# Patient Record
Sex: FEMALE | Race: WHITE | NOT HISPANIC OR LATINO | Employment: OTHER | URBAN - NONMETROPOLITAN AREA
[De-identification: names, ages, dates, MRNs, and addresses within clinical notes are randomized per-mention and may not be internally consistent; named-entity substitution may affect disease eponyms.]

---

## 2018-01-15 ENCOUNTER — HOSPITAL ENCOUNTER (OUTPATIENT)
Dept: GENERAL RADIOLOGY | Facility: HOSPITAL | Age: 68
Discharge: HOME | End: 2018-01-15
Admitting: INTERNAL MEDICINE

## 2018-01-15 ENCOUNTER — TRANSCRIBE ORDERS (OUTPATIENT)
Dept: ADMINISTRATIVE | Facility: HOSPITAL | Age: 68
End: 2018-01-15

## 2018-01-15 DIAGNOSIS — M79.7 FIBROMYALGIA: ICD-10-CM

## 2018-01-15 DIAGNOSIS — L40.50 ARTHROPATHIC PSORIASIS, UNSPECIFIED (HCC): Primary | ICD-10-CM

## 2018-01-15 DIAGNOSIS — M15.0 PRIMARY GENERALIZED (OSTEO)ARTHRITIS: ICD-10-CM

## 2018-01-15 PROCEDURE — 73522 X-RAY EXAM HIPS BI 3-4 VIEWS: CPT

## 2019-07-03 ENCOUNTER — OFFICE VISIT (OUTPATIENT)
Dept: PULMONOLOGY | Facility: CLINIC | Age: 69
End: 2019-07-03

## 2019-07-03 VITALS
DIASTOLIC BLOOD PRESSURE: 60 MMHG | WEIGHT: 152 LBS | HEIGHT: 65 IN | HEART RATE: 98 BPM | BODY MASS INDEX: 25.33 KG/M2 | OXYGEN SATURATION: 99 % | TEMPERATURE: 98.4 F | SYSTOLIC BLOOD PRESSURE: 115 MMHG

## 2019-07-03 DIAGNOSIS — R91.1 RIGHT UPPER LOBE PULMONARY NODULE: Primary | ICD-10-CM

## 2019-07-03 DIAGNOSIS — J96.11 CHRONIC RESPIRATORY FAILURE WITH HYPOXIA (HCC): ICD-10-CM

## 2019-07-03 DIAGNOSIS — C34.11 MALIGNANT NEOPLASM OF UPPER LOBE OF RIGHT LUNG (HCC): ICD-10-CM

## 2019-07-03 DIAGNOSIS — J43.2 CENTRILOBULAR EMPHYSEMA (HCC): ICD-10-CM

## 2019-07-03 PROCEDURE — 99204 OFFICE O/P NEW MOD 45 MIN: CPT | Performed by: INTERNAL MEDICINE

## 2019-07-03 RX ORDER — FERROUS SULFATE TAB EC 324 MG (65 MG FE EQUIVALENT) 324 (65 FE) MG
324 TABLET DELAYED RESPONSE ORAL
COMMUNITY

## 2019-07-03 RX ORDER — ALBUTEROL SULFATE 90 UG/1
AEROSOL, METERED RESPIRATORY (INHALATION)
Refills: 3 | COMMUNITY
Start: 2019-05-21 | End: 2020-01-07 | Stop reason: SDUPTHER

## 2019-07-03 RX ORDER — ALBUTEROL SULFATE 2.5 MG/3ML
2.5 SOLUTION RESPIRATORY (INHALATION)
COMMUNITY
Start: 2019-05-17 | End: 2020-01-07 | Stop reason: SDUPTHER

## 2019-07-03 RX ORDER — M-VIT,TX,IRON,MINS/CALC/FOLIC 27MG-0.4MG
1 TABLET ORAL
COMMUNITY

## 2019-07-03 RX ORDER — BUDESONIDE AND FORMOTEROL FUMARATE DIHYDRATE 160; 4.5 UG/1; UG/1
2 AEROSOL RESPIRATORY (INHALATION)
COMMUNITY
Start: 2019-03-11 | End: 2020-01-07 | Stop reason: SDUPTHER

## 2019-07-03 RX ORDER — CLOPIDOGREL BISULFATE 75 MG/1
75 TABLET ORAL
COMMUNITY
Start: 2019-01-30

## 2019-07-03 RX ORDER — SPIRONOLACTONE 50 MG/1
25 TABLET, FILM COATED ORAL
COMMUNITY
Start: 2019-01-30

## 2019-07-03 RX ORDER — CHLORAL HYDRATE 500 MG
1000 CAPSULE ORAL
COMMUNITY

## 2019-07-03 RX ORDER — LOSARTAN POTASSIUM 25 MG/1
25 TABLET ORAL
COMMUNITY
Start: 2019-01-30

## 2019-07-03 RX ORDER — NITROGLYCERIN 6.5 MG/1
CAPSULE ORAL
COMMUNITY
Start: 2019-04-10

## 2019-07-03 RX ORDER — FENOFIBRATE 48 MG/1
48 TABLET, COATED ORAL
COMMUNITY
Start: 2019-01-30

## 2019-07-03 RX ORDER — PANTOPRAZOLE SODIUM 40 MG/1
40 TABLET, DELAYED RELEASE ORAL
COMMUNITY
Start: 2019-01-30

## 2019-07-03 RX ORDER — ASPIRIN 81 MG/1
81 TABLET ORAL
COMMUNITY

## 2019-07-03 RX ORDER — HYDROXYCHLOROQUINE SULFATE 200 MG/1
TABLET, FILM COATED ORAL
Refills: 1 | COMMUNITY
Start: 2019-06-07

## 2019-07-03 NOTE — PROGRESS NOTES
Interval history since last visit: None    Recent hospitalizations: None    Investigations (imaging, PFT's, labs, sleep study, record requests, etc.) CT Chest    Have you had the Influenza Vaccine? yes    Would you like to receive this Vaccine today? no    Have you had the Pneumonia Vaccine?  yes   Would you like to receive this Vaccine today? no    Subjective    Bhavana Hanson presents for the following Lung Cancer      Lung Cancer   Associated symptoms include coughing (Productive). Pertinent negatives include no chest pain, chills, congestion, fatigue or nausea.        Review of Systems   Constitutional: Negative for activity change, appetite change, chills, fatigue and unexpected weight change.   HENT: Negative for congestion, postnasal drip and rhinorrhea.    Respiratory: Positive for cough (Productive), shortness of breath and wheezing. Negative for apnea and chest tightness.    Cardiovascular: Negative for chest pain, palpitations and leg swelling.   Gastrointestinal: Negative for nausea.   Musculoskeletal: Negative for gait problem.   Skin: Negative for pallor.   Allergic/Immunologic: Negative for environmental allergies.   Neurological: Negative for syncope.   Psychiatric/Behavioral: Negative for confusion. The patient is not nervous/anxious.        Active Problems:  Problem List Items Addressed This Visit     None      Visit Diagnoses     Right upper lobe pulmonary nodule    -  Primary    Chronic respiratory failure with hypoxia (CMS/HCC)        Centrilobular emphysema (CMS/HCC)        Relevant Medications    albuterol (PROVENTIL) (2.5 MG/3ML) 0.083% nebulizer solution    budesonide-formoterol (SYMBICORT) 160-4.5 MCG/ACT inhaler    tiotropium (SPIRIVA HANDIHALER) 18 MCG per inhalation capsule    VENTOLIN  (90 Base) MCG/ACT inhaler    Malignant neoplasm of upper lobe of right lung (CMS/HCC)        Relevant Medications    albuterol (PROVENTIL) (2.5 MG/3ML) 0.083% nebulizer solution     budesonide-formoterol (SYMBICORT) 160-4.5 MCG/ACT inhaler    tiotropium (SPIRIVA HANDIHALER) 18 MCG per inhalation capsule    VENTOLIN  (90 Base) MCG/ACT inhaler          Past Medical History:  Past Medical History:   Diagnosis Date   • Arthritis    • COPD (chronic obstructive pulmonary disease) (CMS/HCC)    • Diabetes mellitus (CMS/HCC)    • Hypertension    • Lung cancer (CMS/[facility])        Family History:  Family History   Problem Relation Age of Onset   • Heart failure Father    • Cancer Maternal Aunt        Social History:  Social History     Tobacco Use   • Smoking status: Current Every Day Smoker     Packs/day: 0.25     Years: 40.00     Pack years: 10.00     Types: Cigarettes   • Smokeless tobacco: Never Used   Substance Use Topics   • Alcohol use: No     Frequency: Never       Current Medications:  Current Outpatient Medications   Medication Sig Dispense Refill   • albuterol (PROVENTIL) (2.5 MG/3ML) 0.083% nebulizer solution Inhale 2.5 mg.     • aspirin 81 MG EC tablet Take 81 mg by mouth.     • budesonide-formoterol (SYMBICORT) 160-4.5 MCG/ACT inhaler Inhale 2 puffs.     • clopidogrel (PLAVIX) 75 MG tablet Take 75 mg by mouth.     • fenofibrate (TRICOR) 48 MG tablet Take 48 mg by mouth.     • ferrous sulfate 324 (65 Fe) MG tablet delayed-release EC tablet Take 324 mg by mouth Daily With Breakfast.     • hydroxychloroquine (PLAQUENIL) 200 MG tablet TAKE 1 TABLET BY MOUTH 2 TIMES A DAY WITH FOOD AS DIRECTED BY YOUR PROVIDER -{KEEP OUT OF REACH OF CHILDREN}-  1   • losartan (COZAAR) 25 MG tablet Take 25 mg by mouth.     • nitroglycerin (NITRO-BID) 6.5 MG CR capsule TAKE 1 CAPSULE TWICE A DAY     • Omega-3 Fatty Acids (FISH OIL) 1000 MG capsule capsule Take 1,000 mg by mouth.     • pantoprazole (PROTONIX) 40 MG EC tablet Take 40 mg by mouth.     • spironolactone (ALDACTONE) 50 MG tablet Take 25 mg by mouth.     • therapeutic multivitamin-minerals (THERAGRAN-M) tablet Take 1 tablet by mouth.     • tiotropium  "(SPIRIVA HANDIHALER) 18 MCG per inhalation capsule Place 18 mcg into inhaler and inhale.     • VENTOLIN  (90 Base) MCG/ACT inhaler INHALE 2 PUFFS 4 TIMES A DAY AS NEEDED FOR SHORTNESS OF BREATH  3     No current facility-administered medications for this visit.        Allergies:  Allergies   Allergen Reactions   • Crestor [Rosuvastatin Calcium] Hives   • Lipitor [Atorvastatin Calcium] Hives       Vitals:  /60   Pulse 98   Temp 98.4 °F (36.9 °C) (Oral)   Ht 165.1 cm (65\")   Wt 68.9 kg (152 lb)   SpO2 99%   BMI 25.29 kg/m²     Imaging:    Imaging Results (most recent)     None          Pulmonary Functions Testing Results:    No results found for: FEV1, FVC, VXH3YXI, TLC, DLCO    No results found for this or any previous visit.    Objective   Physical Exam    Assessment/Plan       ICD-10-CM ICD-9-CM   1. Right upper lobe pulmonary nodule R91.1 793.11   2. Chronic respiratory failure with hypoxia (CMS/HCC) J96.11 518.83     799.02   3. Centrilobular emphysema (CMS/HCC) J43.2 492.8   4. Malignant neoplasm of upper lobe of right lung (CMS/HCC) C34.11 162.3       Return in about 3 months (around 10/3/2019).          "

## 2019-07-03 NOTE — PROGRESS NOTES
Subjective    Bhavana Hanson presents for the following Lung Cancer  .    History of Present Illness   Ms. Hanson is a pleasant 68-year-old female with a history of active smoking, right upper lobe nodule (not biopsied due to very poor lung function test at high risk for pneumothorax) status post radiation therapy, chronic hypoxic respiratory failure on 3 L/min of supplemental oxygen at rest and on exertion and severe COPD on Symbicort nebs, Spiriva nebs and rescue inhaler. she presents to pulmonary clinic for the evaluation and management of small focal opacity that was seen in posterior right upper lobe in the CT scan that was performed at [facility] on 6/10/2019.  As per her, shortness of breath on exertion is stable.  She uses 3 L/min of supplemental oxygen.  She is currently saturating 99%.  She is hemodynamically stable and afebrile.  She denies any cough or wheezing.  When she was admitted at [facility], she had symptoms of fever, productive cough and wheezing.  She denied any blood in the cough.  As per her she did lose 10 pounds of weight in last 6 months but she denies any night sweats or subjective fever.  As per her she underwent 5 cycles of radiation therapy in March 2019 and since then she had multiple episodes of pneumonia and she was on broad-spectrum antibiotics for that.    I have reviewed the CT scan report from [facility].  CT scan was performed on 6/10/2019.  As per the report, no convincing evidence of pneumonia.  Mild basilar atelectasis.  Previous interstitial pulmonary edema has resolved.  Bilateral pleural effusion are now tiny.  Small pericardial effusion.  Probable focal atelectasis in right upper lobe.  There was no significant adenopathy.  Small focal opacity in posterior right upper lobe has decreased in size slightly and is likely an area of atelectasis rather than neoplasia.  Otherwise lung parenchyma is clean.  Mitral  valve annular calcification.  Borderline cardiomegaly.    Review of system  Review of Systems   Constitution: Positive for weight loss. Negative for decreased appetite.   HENT: Negative for congestion and hoarse voice.    Eyes: Negative for blurred vision and double vision.   Cardiovascular: Negative for chest pain and cyanosis.   Respiratory: Positive for shortness of breath. Negative for cough, hemoptysis, sputum production and wheezing.    Endocrine: Negative for cold intolerance and heat intolerance.   Hematologic/Lymphatic: Negative for adenopathy and bleeding problem.   Skin: Negative for itching and rash.   Musculoskeletal: Negative for joint pain and joint swelling.   Gastrointestinal: Negative for bloating and abdominal pain.   Genitourinary: Negative for frequency and hematuria.   Neurological: Negative for dizziness and focal weakness.   Psychiatric/Behavioral: Negative for altered mental status and hallucinations.   Allergic/Immunologic: Negative for environmental allergies and hives.         Past Medical History:  Past Medical History:   Diagnosis Date   • Arthritis    • COPD (chronic obstructive pulmonary disease) (CMS/[facility])    • Diabetes mellitus (CMS/[facility])    • Hypertension    • Lung cancer (CMS/[facility])        Family History:  Family History   Problem Relation Age of Onset   • Heart failure Father    • Cancer Maternal Aunt        Social History:  Social History     Tobacco Use   • Smoking status: Current Every Day Smoker     Packs/day: 0.25     Years: 40.00     Pack years: 10.00     Types: Cigarettes   • Smokeless tobacco: Never Used   Substance Use Topics   • Alcohol use: No     Frequency: Never       Current Medications:  Current Outpatient Medications   Medication Sig Dispense Refill   • albuterol (PROVENTIL) (2.5 MG/3ML) 0.083% nebulizer solution Inhale 2.5 mg.     • aspirin 81 MG EC tablet Take 81 mg by mouth.     • budesonide-formoterol (SYMBICORT) 160-4.5 MCG/ACT inhaler Inhale 2 puffs.     •  "clopidogrel (PLAVIX) 75 MG tablet Take 75 mg by mouth.     • fenofibrate (TRICOR) 48 MG tablet Take 48 mg by mouth.     • ferrous sulfate 324 (65 Fe) MG tablet delayed-release EC tablet Take 324 mg by mouth Daily With Breakfast.     • hydroxychloroquine (PLAQUENIL) 200 MG tablet TAKE 1 TABLET BY MOUTH 2 TIMES A DAY WITH FOOD AS DIRECTED BY YOUR PROVIDER --  1   • losartan (COZAAR) 25 MG tablet Take 25 mg by mouth.     • nitroglycerin (NITRO-BID) 6.5 MG CR capsule TAKE 1 CAPSULE TWICE A DAY     • Omega-3 Fatty Acids (FISH OIL) 1000 MG capsule capsule Take 1,000 mg by mouth.     • pantoprazole (PROTONIX) 40 MG EC tablet Take 40 mg by mouth.     • spironolactone (ALDACTONE) 50 MG tablet Take 25 mg by mouth.     • therapeutic multivitamin-minerals (THERAGRAN-M) tablet Take 1 tablet by mouth.     • tiotropium (SPIRIVA HANDIHALER) 18 MCG per inhalation capsule Place 18 mcg into inhaler and inhale.     • VENTOLIN  (90 Base) MCG/ACT inhaler INHALE 2 PUFFS 4 TIMES A DAY AS NEEDED FOR SHORTNESS OF BREATH  3     No current facility-administered medications for this visit.        Allergies:  Allergies   Allergen Reactions   • Crestor [Rosuvastatin Calcium] Hives   • Lipitor [Atorvastatin Calcium] Hives       Vitals:  /60   Pulse 98   Temp 98.4 °F (36.9 °C) (Oral)   Ht 165.1 cm (65\")   Wt 68.9 kg (152 lb)   SpO2 99%   BMI 25.29 kg/m²     No results found for this or any previous visit.    Objective   Physical Exam:  Physical Exam   Constitutional: She is oriented to person, place, and time. She appears well-developed and well-nourished. No distress.   HENT:   Head: Normocephalic and atraumatic.   Eyes: Conjunctivae are normal. Pupils are equal, round, and reactive to light.   Neck: Normal range of motion. Neck supple.   Cardiovascular: Normal rate and regular rhythm.   Pulmonary/Chest: Effort normal and breath sounds normal. She has no wheezes. She has no rales.   Abdominal: Soft. Bowel sounds are normal. "   Musculoskeletal: Normal range of motion. She exhibits no edema.   Neurological: She is alert and oriented to person, place, and time. No cranial nerve deficit.   Skin: Skin is warm and dry. She is not diaphoretic. No erythema.   Psychiatric: She has a normal mood and affect. Her behavior is normal.         I have reviewed the past medical history, past surgical history, family history and social history of the patient.        Labs:    Imaging  I have reviewed the CT scan report from [facility].  CT scan was performed on 6/10/2019.  As per the report, no convincing evidence of pneumonia.  Mild basilar atelectasis.  Previous interstitial pulmonary edema has resolved.  Bilateral pleural effusion are now tiny.  Small pericardial effusion.  Probable focal atelectasis in right upper lobe.  There was no significant adenopathy.  Small focal opacity in posterior right upper lobe has decreased in size slightly and is likely an area of atelectasis rather than neoplasia.  Otherwise lung parenchyma is clean.  Mitral valve annular calcification.  Borderline cardiomegaly.        Assessment/Plan   1. Right upper lobe pulmonary nodule  Patient was recently admitted to [facility] for pneumonia.  As per her the pneumonia was on the right upper lobe.  She has completed a course of antibiotics.  Her symptoms have subsided.  I have reviewed the report of CT scan that was performed in [facility].  We will retrieve the CT images of the scan.  We will retrieve the images of CT scan of chest that was performed earlier when she was diagnosed with malignant neoplasm of right upper lobe of the lung.  -I will repeat the CT scan of the chest during her next clinic visit.    2. Chronic respiratory failure with hypoxia (CMS/HCC)  -Currently on 3 L/min of supplemental oxygen at rest and on exertion.  Titrate FiO2 to keep SPO2 around 90%. patient could not perform 6-minute walk test due to pain in the  leg.    3. Centrilobular emphysema (CMS/HCC)  -Continue rescue inhalers, Symbicort nebs and Spiriva nebs  -Patient recently moved from [place].  She had primary pulmonary physician in [place].  We will retrieve the old records including pulmonary function test and CT scan of the chest.  She also has her radio oncologist in [place].  We will retrieve records from them too regarding the number of session of radiation therapy and how many degrees of radiation used in total.  Because differential diagnosis of consolidation also includes organizing pneumonia due to radiation pneumonitis    4. Malignant neoplasm of upper lobe of right lung (CMS/HCC)  -As per her, she was not a surgical candidate.  She was at very high risk for complication so biopsy was not performed and she underwent radiation therapy of the nodule that was in right upper lobe of the lung.  She is currently a smoker.          Follow up in 3 months and as needed.

## 2019-07-03 NOTE — PROGRESS NOTES
Subjective    Bhavana Hanson presents for the following Lung Cancer  .    History of Present Illness   Ms. Hanson is a pleasant 68-year-old female with a history of active smoking, right upper lobe nodule (not biopsied due to very poor lung function test at high risk for pneumothorax) status post radiation therapy, chronic hypoxic respiratory failure on 3 L/min of supplemental oxygen at rest and on exertion and severe COPD on Symbicort nebs, Spiriva nebs and rescue inhaler. she presents to pulmonary clinic for the evaluation and management of small focal opacity that was seen in posterior right upper lobe in the CT scan that was performed at [facility] on 6/10/2019.  As per her, shortness of breath on exertion is stable.  She uses 3 L/min of supplemental oxygen.  She is currently saturating 99%.  She is hemodynamically stable and afebrile.  She denies any cough or wheezing.  When she was admitted at [facility], she had symptoms of fever, productive cough and wheezing.  She denied any blood in the cough.  As per her she did lose 10 pounds of weight in last 6 months but she denies any night sweats or subjective fever.  As per her she underwent 5 cycles of radiation therapy in March 2019 and since then she had multiple episodes of pneumonia and she was on broad-spectrum antibiotics for that.    I have reviewed the CT scan report from [facility].  CT scan was performed on 6/10/2019.  As per the report, no convincing evidence of pneumonia.  Mild basilar atelectasis.  Previous interstitial pulmonary edema has resolved.  Bilateral pleural effusion are now tiny.  Small pericardial effusion.  Probable focal atelectasis in right upper lobe.  There was no significant adenopathy.  Small focal opacity in posterior right upper lobe has decreased in size slightly and is likely an area of atelectasis rather than neoplasia.  Otherwise lung parenchyma is clean.  Mitral  valve annular calcification.  Borderline cardiomegaly.    Review of system  Review of Systems   Constitution: Positive for weight loss. Negative for decreased appetite.   HENT: Negative for congestion and hoarse voice.    Eyes: Negative for blurred vision and double vision.   Cardiovascular: Negative for chest pain and cyanosis.   Respiratory: Positive for shortness of breath. Negative for cough, hemoptysis, sputum production and wheezing.    Endocrine: Negative for cold intolerance and heat intolerance.   Hematologic/Lymphatic: Negative for adenopathy and bleeding problem.   Skin: Negative for itching and rash.   Musculoskeletal: Negative for joint pain and joint swelling.   Gastrointestinal: Negative for bloating and abdominal pain.   Genitourinary: Negative for frequency and hematuria.   Neurological: Negative for dizziness and focal weakness.   Psychiatric/Behavioral: Negative for altered mental status and hallucinations.   Allergic/Immunologic: Negative for environmental allergies and hives.         Past Medical History:  Past Medical History:   Diagnosis Date   • Arthritis    • COPD (chronic obstructive pulmonary disease) (CMS/[facility])    • Diabetes mellitus (CMS/[facility])    • Hypertension    • Lung cancer (CMS/[facility])        Family History:  Family History   Problem Relation Age of Onset   • Heart failure Father    • Cancer Maternal Aunt        Social History:  Social History     Tobacco Use   • Smoking status: Current Every Day Smoker     Packs/day: 0.25     Years: 40.00     Pack years: 10.00     Types: Cigarettes   • Smokeless tobacco: Never Used   Substance Use Topics   • Alcohol use: No     Frequency: Never       Current Medications:  Current Outpatient Medications   Medication Sig Dispense Refill   • albuterol (PROVENTIL) (2.5 MG/3ML) 0.083% nebulizer solution Inhale 2.5 mg.     • aspirin 81 MG EC tablet Take 81 mg by mouth.     • budesonide-formoterol (SYMBICORT) 160-4.5 MCG/ACT inhaler Inhale 2 puffs.     •  "clopidogrel (PLAVIX) 75 MG tablet Take 75 mg by mouth.     • fenofibrate (TRICOR) 48 MG tablet Take 48 mg by mouth.     • ferrous sulfate 324 (65 Fe) MG tablet delayed-release EC tablet Take 324 mg by mouth Daily With Breakfast.     • hydroxychloroquine (PLAQUENIL) 200 MG tablet TAKE 1 TABLET BY MOUTH 2 TIMES A DAY WITH FOOD AS DIRECTED BY YOUR PROVIDER -{KEEP OUT OF REACH OF CHILDREN}-  1   • losartan (COZAAR) 25 MG tablet Take 25 mg by mouth.     • nitroglycerin (NITRO-BID) 6.5 MG CR capsule TAKE 1 CAPSULE TWICE A DAY     • Omega-3 Fatty Acids (FISH OIL) 1000 MG capsule capsule Take 1,000 mg by mouth.     • pantoprazole (PROTONIX) 40 MG EC tablet Take 40 mg by mouth.     • spironolactone (ALDACTONE) 50 MG tablet Take 25 mg by mouth.     • therapeutic multivitamin-minerals (THERAGRAN-M) tablet Take 1 tablet by mouth.     • tiotropium (SPIRIVA HANDIHALER) 18 MCG per inhalation capsule Place 18 mcg into inhaler and inhale.     • VENTOLIN  (90 Base) MCG/ACT inhaler INHALE 2 PUFFS 4 TIMES A DAY AS NEEDED FOR SHORTNESS OF BREATH  3     No current facility-administered medications for this visit.        Allergies:  Allergies   Allergen Reactions   • Crestor [Rosuvastatin Calcium] Hives   • Lipitor [Atorvastatin Calcium] Hives       Vitals:  /60   Pulse 98   Temp 98.4 °F (36.9 °C) (Oral)   Ht 165.1 cm (65\")   Wt 68.9 kg (152 lb)   SpO2 99%   BMI 25.29 kg/m²     No results found for this or any previous visit.    Objective   Physical Exam:  Physical Exam   Constitutional: She is oriented to person, place, and time. She appears well-developed and well-nourished. No distress.   HENT:   Head: Normocephalic and atraumatic.   Eyes: Conjunctivae are normal. Pupils are equal, round, and reactive to light.   Neck: Normal range of motion. Neck supple.   Cardiovascular: Normal rate and regular rhythm.   Pulmonary/Chest: Effort normal and breath sounds normal. She has no wheezes. She has no rales.   Abdominal: " Soft. Bowel sounds are normal.   Musculoskeletal: Normal range of motion. She exhibits no edema.   Neurological: She is alert and oriented to person, place, and time. No cranial nerve deficit.   Skin: Skin is warm and dry. She is not diaphoretic. No erythema.   Psychiatric: She has a normal mood and affect. Her behavior is normal.         I have reviewed the past medical history, past surgical history, family history and social history of the patient.        Labs:    Imaging  I have reviewed the CT scan report from [facility].  CT scan was performed on 6/10/2019.  As per the report, no convincing evidence of pneumonia.  Mild basilar atelectasis.  Previous interstitial pulmonary edema has resolved.  Bilateral pleural effusion are now tiny.  Small pericardial effusion.  Probable focal atelectasis in right upper lobe.  There was no significant adenopathy.  Small focal opacity in posterior right upper lobe has decreased in size slightly and is likely an area of atelectasis rather than neoplasia.  Otherwise lung parenchyma is clean.  Mitral valve annular calcification.  Borderline cardiomegaly.        Assessment/Plan   1. Right upper lobe pulmonary nodule  Patient was recently admitted to [facility] for pneumonia.  As per her the pneumonia was on the right upper lobe.  She has completed a course of antibiotics.  Her symptoms have subsided.  I have reviewed the report of CT scan that was performed in [facility].  We will retrieve the CT images of the scan.  We will retrieve the images of CT scan of chest that was performed earlier when she was diagnosed with malignant neoplasm of right upper lobe of the lung.  -I will repeat the CT scan of the chest during her next clinic visit.    2. Chronic respiratory failure with hypoxia (CMS/HCC)  -Currently on 3 L/min of supplemental oxygen at rest and on exertion.  Titrate FiO2 to keep SPO2 around 90%. patient could not perform 6-minute walk  test due to pain in the leg.    3. Centrilobular emphysema (CMS/HCC)  -Continue rescue inhalers, Symbicort nebs and Spiriva nebs  -Patient recently moved from [place].  She had primary pulmonary physician in [place].  We will retrieve the old records including pulmonary function test and CT scan of the chest.  She also has her radio oncologist in [place].  We will retrieve records from them too regarding the number of session of radiation therapy and how many degrees of radiation used in total.  Because differential diagnosis of consolidation also includes organizing pneumonia due to radiation pneumonitis    4. Malignant neoplasm of upper lobe of right lung (CMS/HCC)  -As per her, she was not a surgical candidate.  She was at very high risk for complication so biopsy was not performed and she underwent radiation therapy of the nodule that was in right upper lobe of the lung.  She is currently a smoker.          Follow up in 3 months and as needed.

## 2019-07-03 NOTE — PROGRESS NOTES
Interval history since last visit: None    Recent hospitalizations: None    Investigations (imaging, PFT's, labs, sleep study, record requests, etc.) CT Chest    Have you had the Influenza Vaccine? yes    Would you like to receive this Vaccine today? no    Have you had the Pneumonia Vaccine?  yes   Would you like to receive this Vaccine today? no    Subjective    Bhavana Hanson presents for the following Lung Cancer      History of Present Illness     Review of Systems   Constitutional: Negative for activity change, appetite change, chills, fatigue and unexpected weight change.   HENT: Negative for congestion, postnasal drip and rhinorrhea.    Respiratory: Positive for cough (Productive), shortness of breath and wheezing. Negative for apnea and chest tightness.    Cardiovascular: Negative for chest pain, palpitations and leg swelling.   Gastrointestinal: Negative for nausea.   Musculoskeletal: Negative for gait problem.   Skin: Negative for pallor.   Allergic/Immunologic: Negative for environmental allergies.   Neurological: Negative for syncope.   Psychiatric/Behavioral: Negative for confusion. The patient is not nervous/anxious.        Active Problems:  Problem List Items Addressed This Visit     None          Past Medical History:  Past Medical History:   Diagnosis Date   • Arthritis    • COPD (chronic obstructive pulmonary disease) (CMS/HCC)    • Diabetes mellitus (CMS/HCC)    • Hypertension    • Lung cancer (CMS/HCC)        Family History:  Family History   Problem Relation Age of Onset   • Heart failure Father    • Cancer Maternal Aunt        Social History:  Social History     Tobacco Use   • Smoking status: Current Every Day Smoker     Packs/day: 0.25     Years: 40.00     Pack years: 10.00     Types: Cigarettes   • Smokeless tobacco: Never Used   Substance Use Topics   • Alcohol use: No     Frequency: Never       Current Medications:  Current Outpatient Medications   Medication Sig Dispense Refill   •  "albuterol (PROVENTIL) (2.5 MG/3ML) 0.083% nebulizer solution Inhale 2.5 mg.     • aspirin 81 MG EC tablet Take 81 mg by mouth.     • budesonide-formoterol (SYMBICORT) 160-4.5 MCG/ACT inhaler Inhale 2 puffs.     • clopidogrel (PLAVIX) 75 MG tablet Take 75 mg by mouth.     • fenofibrate (TRICOR) 48 MG tablet Take 48 mg by mouth.     • ferrous sulfate 324 (65 Fe) MG tablet delayed-release EC tablet Take 324 mg by mouth Daily With Breakfast.     • hydroxychloroquine (PLAQUENIL) 200 MG tablet TAKE 1 TABLET BY MOUTH 2 TIMES A DAY WITH FOOD AS DIRECTED BY YOUR PROVIDER -{KEEP OUT OF REACH OF CHILDREN}-  1   • losartan (COZAAR) 25 MG tablet Take 25 mg by mouth.     • nitroglycerin (NITRO-BID) 6.5 MG CR capsule TAKE 1 CAPSULE TWICE A DAY     • Omega-3 Fatty Acids (FISH OIL) 1000 MG capsule capsule Take 1,000 mg by mouth.     • pantoprazole (PROTONIX) 40 MG EC tablet Take 40 mg by mouth.     • spironolactone (ALDACTONE) 50 MG tablet Take 25 mg by mouth.     • therapeutic multivitamin-minerals (THERAGRAN-M) tablet Take 1 tablet by mouth.     • tiotropium (SPIRIVA HANDIHALER) 18 MCG per inhalation capsule Place 18 mcg into inhaler and inhale.     • VENTOLIN  (90 Base) MCG/ACT inhaler INHALE 2 PUFFS 4 TIMES A DAY AS NEEDED FOR SHORTNESS OF BREATH  3     No current facility-administered medications for this visit.        Allergies:  Allergies   Allergen Reactions   • Crestor [Rosuvastatin Calcium] Hives   • Lipitor [Atorvastatin Calcium] Hives       Vitals:  /60   Pulse 98   Temp 98.4 °F (36.9 °C) (Oral)   Ht 165.1 cm (65\")   Wt 68.9 kg (152 lb)   SpO2 99%   BMI 25.29 kg/m²     Imaging:    Imaging Results (most recent)     None          Pulmonary Functions Testing Results:    No results found for: FEV1, FVC, KOL0AUP, TLC, DLCO    No results found for this or any previous visit.    Objective   Physical Exam    Assessment/Plan     No diagnosis found.    No Follow-up on file.          "

## 2019-10-15 ENCOUNTER — TELEPHONE (OUTPATIENT)
Dept: PULMONOLOGY | Facility: CLINIC | Age: 69
End: 2019-10-15

## 2019-10-15 DIAGNOSIS — C34.11 CANCER OF UPPER LOBE OF RIGHT LUNG (HCC): Primary | ICD-10-CM

## 2019-10-15 NOTE — TELEPHONE ENCOUNTER
Dr. Pacheco requested to repeat CT imaging in 6 months from previous completed on 6/10/2019.  This was for follow-up of a right upper pulmonary nodule, a malignant neoplasm.  However, she was not a surgical candidate.  And thus underwent radiation therapy.     Patient is currently scheduled to follow-up in January 2020.   Discussed his recommendations and that this can be ordered and performed prior to the visit.    She requested CT order be placed through [facility] in [place].    I have ordered a CT scan of the chest without contrast for follow-up of the right upper lobe malignancy for approximately December 10th 2019 for a 6-month follow-up.  Requested that the imaging be completed through the [facility] imaging services.

## 2020-01-01 ENCOUNTER — HOSPITAL ENCOUNTER (OUTPATIENT)
Dept: CT IMAGING | Facility: HOSPITAL | Age: 70
Discharge: HOME | End: 2020-09-02
Admitting: PHYSICIAN ASSISTANT

## 2020-01-01 ENCOUNTER — OFFICE VISIT (OUTPATIENT)
Dept: PULMONOLOGY | Facility: CLINIC | Age: 70
End: 2020-01-01

## 2020-01-01 ENCOUNTER — TELEPHONE (OUTPATIENT)
Dept: PULMONOLOGY | Facility: CLINIC | Age: 70
End: 2020-01-01

## 2020-01-01 VITALS — WEIGHT: 165 LBS | HEIGHT: 65 IN | BODY MASS INDEX: 27.49 KG/M2

## 2020-01-01 DIAGNOSIS — R91.8 MULTIPLE PULMONARY NODULES: ICD-10-CM

## 2020-01-01 DIAGNOSIS — J96.11 CHRONIC RESPIRATORY FAILURE WITH HYPOXIA (HCC): ICD-10-CM

## 2020-01-01 DIAGNOSIS — C34.11 MALIGNANT NEOPLASM OF UPPER LOBE OF RIGHT LUNG (HCC): ICD-10-CM

## 2020-01-01 DIAGNOSIS — J43.2 CENTRILOBULAR EMPHYSEMA (HCC): Primary | ICD-10-CM

## 2020-01-01 DIAGNOSIS — Z72.0 CURRENT TOBACCO USE: ICD-10-CM

## 2020-01-01 PROCEDURE — 99443 PR PHYS/QHP TELEPHONE EVALUATION 21-30 MIN: CPT | Performed by: PHYSICIAN ASSISTANT

## 2020-01-01 PROCEDURE — 71250 CT THORAX DX C-: CPT | Performed by: RADIOLOGY

## 2020-01-01 PROCEDURE — 71250 CT THORAX DX C-: CPT

## 2020-01-01 RX ORDER — DOXYCYCLINE HYCLATE 100 MG/1
100 CAPSULE ORAL 2 TIMES DAILY
Qty: 10 CAPSULE | Refills: 0 | Status: SHIPPED | OUTPATIENT
Start: 2020-01-01 | End: 2020-01-01

## 2020-01-01 RX ORDER — BUDESONIDE AND FORMOTEROL FUMARATE DIHYDRATE 160; 4.5 UG/1; UG/1
2 AEROSOL RESPIRATORY (INHALATION)
Qty: 3 INHALER | Refills: 8 | Status: SHIPPED | OUTPATIENT
Start: 2020-01-01 | End: 2020-01-01 | Stop reason: SDUPTHER

## 2020-01-01 RX ORDER — BUDESONIDE AND FORMOTEROL FUMARATE DIHYDRATE 160; 4.5 UG/1; UG/1
2 AEROSOL RESPIRATORY (INHALATION)
Qty: 3 INHALER | Refills: 8 | Status: SHIPPED | OUTPATIENT
Start: 2020-01-01 | End: 2021-01-01 | Stop reason: SDUPTHER

## 2020-01-01 RX ORDER — GUAIFENESIN 600 MG/1
1200 TABLET, EXTENDED RELEASE ORAL 2 TIMES DAILY
Qty: 120 TABLET | Refills: 3 | Status: SHIPPED | OUTPATIENT
Start: 2020-01-01 | End: 2020-01-01

## 2020-01-01 RX ORDER — PREDNISONE 10 MG/1
5 TABLET ORAL 2 TIMES DAILY
COMMUNITY
End: 2020-01-01

## 2020-01-01 RX ORDER — ALBUTEROL SULFATE 90 UG/1
2 AEROSOL, METERED RESPIRATORY (INHALATION) EVERY 4 HOURS PRN
Qty: 54 G | Refills: 8 | Status: SHIPPED | OUTPATIENT
Start: 2020-01-01

## 2020-01-01 RX ORDER — PREDNISONE 20 MG/1
20 TABLET ORAL 2 TIMES DAILY
Qty: 10 TABLET | Refills: 0 | Status: SHIPPED | OUTPATIENT
Start: 2020-01-01 | End: 2020-01-01

## 2020-01-07 ENCOUNTER — OFFICE VISIT (OUTPATIENT)
Dept: PULMONOLOGY | Facility: CLINIC | Age: 70
End: 2020-01-07

## 2020-01-07 VITALS
TEMPERATURE: 98 F | BODY MASS INDEX: 27.36 KG/M2 | SYSTOLIC BLOOD PRESSURE: 130 MMHG | DIASTOLIC BLOOD PRESSURE: 80 MMHG | HEIGHT: 65 IN | HEART RATE: 83 BPM | WEIGHT: 164.2 LBS | OXYGEN SATURATION: 96 %

## 2020-01-07 DIAGNOSIS — R91.1 RIGHT UPPER LOBE PULMONARY NODULE: Primary | ICD-10-CM

## 2020-01-07 DIAGNOSIS — J43.2 CENTRILOBULAR EMPHYSEMA (HCC): ICD-10-CM

## 2020-01-07 DIAGNOSIS — Z72.0 CURRENT TOBACCO USE: ICD-10-CM

## 2020-01-07 DIAGNOSIS — J96.11 CHRONIC RESPIRATORY FAILURE WITH HYPOXIA (HCC): ICD-10-CM

## 2020-01-07 DIAGNOSIS — C34.11 MALIGNANT NEOPLASM OF UPPER LOBE OF RIGHT LUNG (HCC): ICD-10-CM

## 2020-01-07 PROBLEM — J44.9 COPD (CHRONIC OBSTRUCTIVE PULMONARY DISEASE) (HCC): Status: ACTIVE | Noted: 2020-01-07

## 2020-01-07 PROCEDURE — 99214 OFFICE O/P EST MOD 30 MIN: CPT | Performed by: PHYSICIAN ASSISTANT

## 2020-01-07 PROCEDURE — 94664 DEMO&/EVAL PT USE INHALER: CPT | Performed by: PHYSICIAN ASSISTANT

## 2020-01-07 RX ORDER — ALBUTEROL SULFATE 2.5 MG/3ML
2.5 SOLUTION RESPIRATORY (INHALATION)
Qty: 120 ML | Refills: 8 | Status: SHIPPED | OUTPATIENT
Start: 2020-01-07

## 2020-01-07 RX ORDER — BUDESONIDE AND FORMOTEROL FUMARATE DIHYDRATE 160; 4.5 UG/1; UG/1
2 AEROSOL RESPIRATORY (INHALATION)
Qty: 1 INHALER | Refills: 8 | Status: SHIPPED | OUTPATIENT
Start: 2020-01-07 | End: 2020-04-03 | Stop reason: SDUPTHER

## 2020-01-07 RX ORDER — ATORVASTATIN CALCIUM 20 MG/1
20 TABLET, FILM COATED ORAL DAILY
COMMUNITY

## 2020-01-07 RX ORDER — ALBUTEROL SULFATE 90 UG/1
2 AEROSOL, METERED RESPIRATORY (INHALATION) EVERY 4 HOURS PRN
Qty: 1 INHALER | Refills: 8 | Status: SHIPPED | OUTPATIENT
Start: 2020-01-07 | End: 2020-01-01 | Stop reason: SDUPTHER

## 2020-01-07 NOTE — PROGRESS NOTES
dHave you had the Influenza Vaccine? yes    Would you like to receive this Vaccine today? no    Have you had the Pneumonia Vaccine?  yes   Would you like to receive this Vaccine today? no    Are you a current smoker? yes   How much? 3-4 cigarettes per day  Quit date? n/a    Subjective    Bhavana Hanson presents for the following pulmonary nodule      History of Present Illness     Has occasional worsening of symptoms.   Patient presents today for follow-up of centrilobular emphysema, chronic hypoxic respiratory failure, right upper lobe pulmonary nodule, and current smoker with minimal use.  She will occasionally note worsening of symptoms but denies requiring any antibiotic or steroid therapy in the outpatient setting recently or recent hospitalization.  Worsening of current symptoms include shortness of breath, wheezing, coughing.  Symptoms are typically improved with use of inhaler regimen.  Patient is also chronically on 2 L/min.  She is using a portable oxygen concentrator today at this rate.  She will use 3 L/min at home due to an extended cord and also uses 3 L/min at nighttime while sleeping.  She recently completed the repeat CT of the chest at [facility] facility this past Friday.  This was completed for follow-up assessment of a right upper lobe pulmonary nodule with previous history of malignant right upper pulmonary nodule.  She completed 5 courses of radiation and was released from oncology services during July 2019.        Review of Systems   Respiratory: Positive for cough. Negative for shortness of breath and wheezing.    Cardiovascular: Negative for chest pain and palpitations.   Neurological: Positive for headaches. Negative for dizziness and light-headedness.       Active Problems:  Problem List Items Addressed This Visit     None          Past Medical History:  Past Medical History:   Diagnosis Date   • Arthritis    • COPD (chronic obstructive pulmonary disease) (CMS/[facility])    •  Diabetes mellitus (CMS/HCC)    • Hypertension    • Lung cancer (CMS/HCC)        Family History:  Family History   Problem Relation Age of Onset   • Heart failure Father    • Cancer Maternal Aunt        Social History:  Social History     Tobacco Use   • Smoking status: Current Every Day Smoker     Packs/day: 0.25     Years: 40.00     Pack years: 10.00     Types: Cigarettes   • Smokeless tobacco: Never Used   Substance Use Topics   • Alcohol use: No     Frequency: Never       Current Medications:  Current Outpatient Medications   Medication Sig Dispense Refill   • albuterol (PROVENTIL) (2.5 MG/3ML) 0.083% nebulizer solution Take 2.5 mg by nebulization 4 (Four) Times a Day. 120 mL 8   • aspirin 81 MG EC tablet Take 81 mg by mouth.     • atorvastatin (LIPITOR) 20 MG tablet Take 20 mg by mouth Daily.     • budesonide-formoterol (SYMBICORT) 160-4.5 MCG/ACT inhaler Inhale 2 puffs 2 (Two) Times a Day. 1 inhaler 8   • clopidogrel (PLAVIX) 75 MG tablet Take 75 mg by mouth.     • fenofibrate (TRICOR) 48 MG tablet Take 48 mg by mouth.     • ferrous sulfate 324 (65 Fe) MG tablet delayed-release EC tablet Take 324 mg by mouth Daily With Breakfast.     • hydroxychloroquine (PLAQUENIL) 200 MG tablet TAKE 1 TABLET BY MOUTH 2 TIMES A DAY WITH FOOD AS DIRECTED BY YOUR PROVIDER -KEEP OUT OF REACH OF CHILDREN  1   • losartan (COZAAR) 25 MG tablet Take 25 mg by mouth.     • nitroglycerin (NITRO-BID) 6.5 MG CR capsule TAKE 1 CAPSULE TWICE A DAY     • Omega-3 Fatty Acids (FISH OIL) 1000 MG capsule capsule Take 1,000 mg by mouth.     • pantoprazole (PROTONIX) 40 MG EC tablet Take 40 mg by mouth.     • spironolactone (ALDACTONE) 50 MG tablet Take 25 mg by mouth.     • therapeutic multivitamin-minerals (THERAGRAN-M) tablet Take 1 tablet by mouth.     • tiotropium (SPIRIVA HANDIHALER) 18 MCG per inhalation capsule Place 1 capsule into inhaler and inhale Daily. 30 capsule 8   • VENTOLIN  (90 Base) MCG/ACT inhaler Inhale 2 puffs Every 4  "(Four) Hours As Needed for Wheezing. 1 inhaler 8   • tiotropium bromide monohydrate (SPIRIVA RESPIMAT) 2.5 MCG/ACT aerosol solution inhaler Inhale 2 puffs Daily. 1 inhaler 1     No current facility-administered medications for this visit.        Allergies:  Allergies   Allergen Reactions   • Crestor [Rosuvastatin Calcium] Hives   • Lipitor [Atorvastatin Calcium] Hives       Vitals:  /80   Pulse 83   Temp 98 °F (36.7 °C)   Ht 165.1 cm (65\")   Wt 74.5 kg (164 lb 3.2 oz)   SpO2 96% Comment: 2lm  BMI 27.32 kg/m²     Imaging:    Imaging Results (Most Recent)     None          Pulmonary Functions Testing Results:    No results found for: FEV1, FVC, IDL4JIX, TLC, DLCO    No results found for this or any previous visit.    Objective   Physical Exam   Constitutional: She is oriented to person, place, and time. She appears well-developed and well-nourished. No distress.   HENT:   Head: Normocephalic and atraumatic.   Nose: Nose normal.   Eyes: Pupils are equal, round, and reactive to light. EOM are normal.   Neck: Normal range of motion. Neck supple.   Cardiovascular: Normal rate, regular rhythm, S1 normal and S2 normal.   Pulmonary/Chest: Effort normal.   Bilateral air entry positive and appreciated throughout.  No wheezing, rhonchi, or crackles.  On 2 L/m via nasal cannula.    Musculoskeletal: Normal range of motion. She exhibits no edema.   Neurological: She is alert and oriented to person, place, and time.   Skin: Skin is warm and dry. She is not diaphoretic.   Psychiatric: She has a normal mood and affect. Her behavior is normal.   Vitals reviewed.      Assessment/Plan      I have reviewed the past medical history, family history, social history, surgical history, and allergies.     Recent CT chest imaging completed. Not yet scanned into the system.     Reviewed the CT chest report from 6/10/2019. Probable focal atelectasis of the right upper lobe noted.     Reviewed the CT chest report from 10/30/2018. "           ICD-10-CM ICD-9-CM   1. Right upper lobe pulmonary nodule R91.1 793.11   2. Chronic respiratory failure with hypoxia (CMS/HCC) J96.11 518.83     799.02   3. Centrilobular emphysema (CMS/HCC) J43.2 492.8   4. Current tobacco use Z72.0 305.1   5. Malignant neoplasm of upper lobe of right lung (CMS/[facility]) C34.11 162.3         Right upper lobe pulmonary nodule with history of maligancy  · Planned to repeat the CT chest in December Completed on Friday of last week at [facility]  · Will request for CT results and review with Dr. Pacheco once available.  · Previously underwent radiation therapy as she was not a surgical candidate.  · Released from oncology services last July. Underwent 5 radiation treatments.       Chronic hypoxic respiratory failure:   · On 2-3 L/m at baseline.  Patient is currently compliant with use.  2 L/min use typically with increased to 3 L/min with extended cord.    Not wearing supplemental oxygen as recommended could increase risk of dizziness, lightheadedness and result in a fall/direct trauma.         Centrilobular emphysema  · Continue Symbicort 2 puffs twice daily  · Continue albuterol nebs as needed and Ventolin inhaler as needed  · Continue Spiriva handihaler once daily.   ·  I provided a sample of Spiriva Respimat 2.5 mcg to start with once daily use (hold Spiriva handihaler).  (discussed that this can be increased to twice daily maximum). Concerned that she as she reports occasional difficulty with use of the HandiHaler and concerned that she may not be able to truly intake the powder form, we will try the Respimat version and monitor for any symptomatic changes.   We will attempt to apply for prior authorization if needed.  Inhaler technique was reviewed as this was a new inhaler type to her.   · Completed spirometry assessment in office.   File will be scanned into her chart.     · Addendum 1/16/2020: Pharmacy will only cover Incruse ellipta at this time.   Ordered Incruse  ellipta.   May try to obtain Spiriva respimat at another time if incruse is not well tolerated.       - Inhaler technique demonstration/discussion:  I have extensively discussed the steps.  In summary, the steps were discussed in the following order. Patient was advised to rinse the mouth after steroid inhalation to prevent fungal mucositis.  · Remove the cap from the inhaler and shake well.    · Breathe out all the way.    · Place the mouthpiece of the inhaler between your teeth and seal your lips tightly around it.    · As you start to blow in slowly, press down on the canister one time.   · Keep breathing in as slowly and deeply as you can.    · It should take about 5 seconds for you to completely breathe in.    · Hold your breath for 10 seconds(count to 10 slowly) to allow the medication to reach the airways of the lung.    · Repeat the above steps for each puff.    · Wait about 1 minute between the puffs.    · Replaced the cap on the inhaler when finished.      Current smoker:   · Continues to smoke 3-4 cigarrettes per day.   · Previously smoked 1/2-1 pack per day.   · Detailed Smoking cessation counseling awaited as patient is aware of the risk involved with continued smoking.    She has significantly decreased use and will continue to try to decrease use on her own as able, but otherwise is not interested in complete cessation at this time.       Vaccinations: reported up to date on pneumonia and influenza vaccinations.     Patient's Body mass index is 27.32 kg/m². BMI is within normal parameters. No follow-up required..      Return in about 3 months (around 4/7/2020), or as needed.

## 2020-03-16 ENCOUNTER — TELEPHONE (OUTPATIENT)
Dept: PULMONOLOGY | Facility: CLINIC | Age: 70
End: 2020-03-16

## 2020-03-16 DIAGNOSIS — R91.8 MULTIPLE PULMONARY NODULES: Primary | ICD-10-CM

## 2020-03-16 DIAGNOSIS — R91.1 RIGHT UPPER LOBE PULMONARY NODULE: ICD-10-CM

## 2020-03-16 DIAGNOSIS — C34.11 MALIGNANT NEOPLASM OF UPPER LOBE OF RIGHT LUNG (HCC): ICD-10-CM

## 2020-03-16 NOTE — TELEPHONE ENCOUNTER
CT scan imaging recently completed at [facility] was reviewed with Dr. Pacheco.     Changes were notable for      I reviewed the changes with her.  In summary, there is an increase of the right upper lobe nodule from 7 mm to 11 mm.  Another superiormost nodule is unchanged 10 mm.  There is a 3 mm nodule in the superior left lower lobe more consistent decubitus than prior.    She had radiation therapy 1 year ago for a right upper lobe malignancy.  She follows with specialty services in [place].  She reports that previously, she was recommended against undergoing any biopsy due to high risk of complications in the setting of significant emphysema.    After discussion and recommendation to seek further evaluation at [facility], she preferred to not undergo any further biopsy or radiation therapy at this time.  I discussed that she could attend the evaluation and see what their further recommendations would be.  She is understanding of the concerns associated with the increase in size of the right upper lung nodule as well as the changing 3 mm nodule.       She prefers at this time to only undergo repeat imaging for follow-up.  Planning for repeat imaging in approximately 3 months after asking for recommendations from Dr. Pacheco.   CT chest without contrast was ordered.     We will follow up with her as scheduled (in April) and as needed.

## 2020-04-03 RX ORDER — BUDESONIDE AND FORMOTEROL FUMARATE DIHYDRATE 160; 4.5 UG/1; UG/1
2 AEROSOL RESPIRATORY (INHALATION)
Qty: 1 INHALER | Refills: 8 | Status: SHIPPED | OUTPATIENT
Start: 2020-04-03 | End: 2020-06-19 | Stop reason: SDUPTHER

## 2020-06-16 ENCOUNTER — HOSPITAL ENCOUNTER (OUTPATIENT)
Dept: CT IMAGING | Facility: HOSPITAL | Age: 70
Discharge: HOME | End: 2020-06-16
Admitting: PHYSICIAN ASSISTANT

## 2020-06-16 DIAGNOSIS — C34.11 MALIGNANT NEOPLASM OF UPPER LOBE OF RIGHT LUNG (HCC): ICD-10-CM

## 2020-06-16 DIAGNOSIS — R91.8 MULTIPLE PULMONARY NODULES: ICD-10-CM

## 2020-06-16 DIAGNOSIS — R91.1 RIGHT UPPER LOBE PULMONARY NODULE: ICD-10-CM

## 2020-06-16 PROCEDURE — 71250 CT THORAX DX C-: CPT

## 2020-06-16 PROCEDURE — 71250 CT THORAX DX C-: CPT | Performed by: RADIOLOGY

## 2020-06-19 ENCOUNTER — TELEPHONE (OUTPATIENT)
Dept: PULMONOLOGY | Facility: CLINIC | Age: 70
End: 2020-06-19

## 2020-06-19 DIAGNOSIS — C34.11 MALIGNANT NEOPLASM OF UPPER LOBE OF RIGHT LUNG (HCC): Primary | ICD-10-CM

## 2020-06-19 DIAGNOSIS — C34.11 MALIGNANT NEOPLASM OF UPPER LOBE OF RIGHT LUNG (HCC): ICD-10-CM

## 2020-06-19 DIAGNOSIS — R91.8 MULTIPLE PULMONARY NODULES: Primary | ICD-10-CM

## 2020-06-19 RX ORDER — IPRATROPIUM BROMIDE AND ALBUTEROL SULFATE 2.5; .5 MG/3ML; MG/3ML
3 SOLUTION RESPIRATORY (INHALATION) 4 TIMES DAILY PRN
Qty: 120 ML | Refills: 11 | Status: SHIPPED | OUTPATIENT
Start: 2020-06-19

## 2020-06-19 RX ORDER — BUDESONIDE AND FORMOTEROL FUMARATE DIHYDRATE 160; 4.5 UG/1; UG/1
2 AEROSOL RESPIRATORY (INHALATION)
Qty: 3 INHALER | Refills: 8 | Status: SHIPPED | OUTPATIENT
Start: 2020-06-19 | End: 2020-01-01 | Stop reason: SDUPTHER

## 2020-06-19 NOTE — TELEPHONE ENCOUNTER
Mrs. Hanson was able to return by call.   We reviewed the CT findings, concerning for possible new pulmonary nodule development and recommendations per Dr. Pacheco for repeat imaging in 3 months.   Again, she was unsure of the specific type of lung cancer she was diagnosed with previously.     She was agreeable to repeat CT imaging in 3 months.   CT ordered during my initial call note.         She reported continued difficulty sleeping. As she currently takes melatonin 20 mg nightly with difficulty staying asleep, I recommended sleep medicine referral as most of the medications as scheduled drugs and I am unable to write those at this time. She preferred to await this referral.     She requested refills of Symbicort, but states that she has to use her albuterol inhaler/neb frequently. She also uses Incruse (Spiriva respimat was not previously covered and she had difficulty using the sample provided).   Discussed that I could first try ordering duonebs to use instead of albuterol for dual action relief. I also recommended using Symbicort earlier in the evening (around 12 hours between uses as this is currently used around 15 hours between), and then using Duonebs before bedtime. She was agreeable to this trial.   Duonebs were ordered through [facility].     If minimal improvement is still noted, we will discuss switching all inhalers to the nebulized form. Follow up currently scheduled for July 28th.

## 2020-07-28 PROBLEM — R91.8 MULTIPLE PULMONARY NODULES: Status: ACTIVE | Noted: 2020-01-07

## 2020-07-28 NOTE — PROGRESS NOTES
You have chosen to receive care through a telephone visit. Do you consent to use a telephone visit for your medical care today? Yes        Investigations (imaging, PFT's, labs, sleep study, record requests, etc.)    Have you had the Influenza Vaccine? yes    Would you like to receive this Vaccine today? no    Have you had the Pneumonia Vaccine?  no  Would you like to receive this Vaccine today? no    Subjective    Bhavana Hanson presents for the following Lung Nodule      History of Present Illness     Patient presents today for follow-up of centrilobular emphysema, pulmonary nodules, current smoking history, chronic hypoxic respiratory failure.  Currently, she notes interval increase of shortness of breath.  She continues to use her DuoNeb treatments as needed and notices improvement of breathing with use.  She also continues to use Symbicort as scheduled, and notices symptomatic relief in the morning.  Incruse was previously ordered as she was unable to use Spiriva (difficulty with use and insurance coverage).  No significant wheezing at this time.  She reports consistent phlegm production this typically clear.  Phlegm occasionally turns yellow.  Phlegm production can be very thick and cause choking at times.  She denies any hemoptysis.  No recent fever.  However she admits to night sweats and hot flashes.  She has attempted to stay at home as much as possible due to COVID-19 concerns.  She remains compliant with use of supplemental oxygen, typically on 3 L/min.  She continues to smoke approximately 5 cigarettes a day.  She has a significant smoking history of approximately 40 years with .5-1 pack per day.      Review of Systems   Constitutional: Negative for chills and fever.   HENT: Positive for congestion. Negative for postnasal drip.    Respiratory: Positive for cough and choking (with phlegm production). Negative for shortness of breath and wheezing.    Endocrine: Positive for heat intolerance. Negative  for cold intolerance.        Active Problems:  Problem List Items Addressed This Visit     None          Past Medical History:  Past Medical History:   Diagnosis Date   • Arthritis    • COPD (chronic obstructive pulmonary disease) (CMS/HCC)    • Diabetes mellitus (CMS/[facility])    • Hypertension    • Lung cancer (CMS/[facility])        Family History:  Family History   Problem Relation Age of Onset   • Heart failure Father    • Cancer Maternal Aunt        Social History:  Social History     Tobacco Use   • Smoking status: Current Every Day Smoker     Packs/day: 0.25     Years: 40.00     Pack years: 10.00     Types: Cigarettes   • Smokeless tobacco: Never Used   Substance Use Topics   • Alcohol use: No     Frequency: Never       Current Medications:  Current Outpatient Medications   Medication Sig Dispense Refill   • albuterol (PROVENTIL) (2.5 MG/3ML) 0.083% nebulizer solution Take 2.5 mg by nebulization 4 (Four) Times a Day. 120 mL 8   • aspirin 81 MG EC tablet Take 81 mg by mouth.     • atorvastatin (LIPITOR) 20 MG tablet Take 20 mg by mouth Daily.     • budesonide-formoterol (Symbicort) 160-4.5 MCG/ACT inhaler Inhale 2 puffs 2 (Two) Times a Day for 90 days. 3 inhaler 8   • clopidogrel (PLAVIX) 75 MG tablet Take 75 mg by mouth.     • fenofibrate (TRICOR) 48 MG tablet Take 48 mg by mouth.     • ferrous sulfate 324 (65 Fe) MG tablet delayed-release EC tablet Take 324 mg by mouth Daily With Breakfast.     • hydroxychloroquine (PLAQUENIL) 200 MG tablet TAKE 1 TABLET BY MOUTH 2 TIMES A DAY WITH FOOD AS DIRECTED BY YOUR PROVIDER KEEP OUT OF REACH OF CHILDREN  1   • ipratropium-albuterol (DUO-NEB) 0.5-2.5 mg/3 ml nebulizer Take 3 mL by nebulization 4 (Four) Times a Day As Needed for Wheezing. 120 mL 11   • losartan (COZAAR) 25 MG tablet Take 25 mg by mouth.     • nitroglycerin (NITRO-BID) 6.5 MG CR capsule TAKE 1 CAPSULE TWICE A DAY     • Omega-3 Fatty Acids (FISH OIL) 1000 MG capsule capsule Take 1,000 mg by mouth.     •  "pantoprazole (PROTONIX) 40 MG EC tablet Take 40 mg by mouth.     • predniSONE (DELTASONE) 10 MG tablet Take 5 mg by mouth 2 (two) times a day. Reducing per Dr. Dejesus     • spironolactone (ALDACTONE) 50 MG tablet Take 25 mg by mouth.     • therapeutic multivitamin-minerals (THERAGRAN-M) tablet Take 1 tablet by mouth.     • Umeclidinium Bromide (INCRUSE ELLIPTA) 62.5 MCG/INH aerosol powder  Inhale 1 puff Daily. 1 inhaler 8   • VENTOLIN  (90 Base) MCG/ACT inhaler Inhale 2 puffs Every 4 (Four) Hours As Needed for Wheezing. 1 inhaler 8   • guaiFENesin (Mucinex) 600 MG 12 hr tablet Take 2 tablets by mouth 2 (Two) Times a Day for 30 days. 120 tablet 3     No current facility-administered medications for this visit.        Allergies:  Allergies   Allergen Reactions   • Crestor [Rosuvastatin Calcium] Hives   • Lipitor [Atorvastatin Calcium] Hives       Vitals:  Ht 165.1 cm (65\")   Wt 74.8 kg (165 lb)   BMI 27.46 kg/m²     Imaging:    Imaging Results (Most Recent)     None          Pulmonary Functions Testing Results:    No results found for: FEV1, FVC, WQL1AAF, TLC, DLCO    No results found for this or any previous visit.    Objective   Physical Exam     Physical exam not completed as encounter was completed via telephone.    Assessment/Plan      I have reviewed the past medical history, family history, social history, surgical history, and allergies.      I reviewed the CT chest imaging and report from 6/16/2020.    Upon personal review, noted a 4 mm left lower nodule on image 42.      I have reviewed the previous CT chest report from June 2019.        ICD-10-CM ICD-9-CM   1. Centrilobular emphysema (CMS/HCC) J43.2 492.8   2. Chronic respiratory failure with hypoxia (CMS/HCC) J96.11 518.83     799.02   3. Multiple pulmonary nodules R91.8 793.19   4. Malignant neoplasm of upper lobe of right lung (CMS/HCC) C34.11 162.3   5. Current tobacco use Z72.0 305.1          Centrilobular emphysema:   · Continue DuoNeb, " albuterol treatments as needed  · Continue Symbicort 2 puffs twice daily  · Continue Incruse Ellipta 1 puff daily  Spiriva Respimat was not previously covered and difficult for the patient to use as she tried a sample inhaler.  · Discussed that increase could be switched to a nebulized form.  She preferred to await the change at this time.  · Ordered Mucinex tablets for as needed use with chest congestion and thick phlegm production.  · On oral steroids per rheumatologist and titrated down the dose.  · Planning for repeat chest CT in 3 months, September 2020  · Discussed if symptomatic worsening noted, to call office and we will order chest x-ray and consider antibiotic therapy as needed.  · In office spirometry was completed his last visit in January 2020.      Chronic hypoxic respiratory failure:   · Compliant with use of supplemental oxygen on 2 to 3 L via nasal cannula.  Patient states that this is mostly on 3 L.      Multiple pulmonary nodules, history of malignant neoplasm of the right upper lung:  Previous CT imaging was completed in June 2020.  This was reviewed with Dr. Pacheco.  Notable for  · Emphysematous blebs  · Right upper lobe nodule measuring 9.7 mm on image 16  · Left upper lobe nodule measuring 8.5 mm on image 36  · Right lower lobe nodule measuring 11 mm on image 65  · Small left lower 4 mm nodule on image 42, not mentioned on the report.  No lymphadenopathy noted.    Dr. Pacheco recommend repeat CT imaging in 3 months.  Prior CT imaging in January 2020 was also notable for interval increase in size of the previously noted nodules.  Patient has history of lung cancer, unsure of specific type and previously followed with oncology service in [place].    · CT chest without contrast currently scheduled for September 2020.        Current smoker:  · Continues with use of approximately 5 cigarettes/day.  · Previously smoked 1/2 to 1 pack/day for approximately 40 years.   · Detailed smoking cessation  counseling was awaited as she is not interested in complete cessation at this time.      Vaccinations: Influenza vaccination up-to-date.        Return in about 3 months (around 10/28/2020), or as needed.           This visit has been rescheduled as a phone visit to comply with patient safety concerns in accordance with CDC recommendations. Total time of discussion was 30 minutes.

## 2020-09-04 NOTE — TELEPHONE ENCOUNTER
I have called patient with her CT scan results.  She was unavailable.  I left a message requesting that she call our office when she became available.    I have reviewed the imaging with Dr. Pacheco.   We discussed that as the nodules have increased in size from previous imaging 3 months prior, he recommended referral to [facility] pulmonology services.  He discussed that she may require further biopsy or localized radiation therapy.    She is notable for previous history of lung cancer and followed up with services in [place], and had been recently released from oncology services in 2019.

## 2020-09-10 NOTE — PROGRESS NOTES
Called patient concerning CT imaging results.  We discussed that 2 of the nodules have increased in size over the last 3 months, that Dr. Pacheco recommended following up with [facility] pulmonology services for further recommendations.  He had discussed that if they are not able to do the biopsy due to her significant underlying emphysema, they may be able to perform localized radiation as she has previous history of right upper lobe malignancy.    She had recently brought previous records from her [place] oncologist and prior CT reports.  Before making a final decision, she requested that the reports be reviewed.   Discussed that as Dr. Pacheco will be gone next week, I will go over these as soon as they are scanned into the chart and call her back the week after next to discuss the further recommendations.  She was agreeable with this plan.    She also requested a mild copy of the CT chest recently completed.    She requested refills of Symbicort today sent to [facility].

## 2020-11-04 PROBLEM — N18.30 TYPE 2 DIABETES MELLITUS WITH STAGE 3 CHRONIC KIDNEY DISEASE, WITHOUT LONG-TERM CURRENT USE OF INSULIN (HCC): Status: ACTIVE | Noted: 2017-03-31

## 2020-11-04 PROBLEM — E11.22 TYPE 2 DIABETES MELLITUS WITH STAGE 3 CHRONIC KIDNEY DISEASE, WITHOUT LONG-TERM CURRENT USE OF INSULIN (HCC): Status: ACTIVE | Noted: 2017-03-31

## 2020-11-04 PROBLEM — Z12.11 COLON CANCER SCREENING: Status: ACTIVE | Noted: 2020-01-01

## 2020-11-04 PROBLEM — J43.2 CENTRILOBULAR EMPHYSEMA (HCC): Status: ACTIVE | Noted: 2020-01-01

## 2020-11-04 PROBLEM — J44.9 COPD, SEVERE (HCC): Status: ACTIVE | Noted: 2017-08-22

## 2020-11-04 PROBLEM — D53.9 MACROCYTIC ANEMIA: Status: ACTIVE | Noted: 2018-02-27

## 2020-11-04 PROBLEM — K63.5 COLON POLYP: Status: ACTIVE | Noted: 2020-01-01

## 2020-11-04 PROBLEM — N18.30 CKD (CHRONIC KIDNEY DISEASE) STAGE 3, GFR 30-59 ML/MIN (HCC): Status: ACTIVE | Noted: 2018-02-12

## 2020-11-04 PROBLEM — R59.0 MEDIASTINAL ADENOPATHY: Status: ACTIVE | Noted: 2018-12-20

## 2020-11-04 PROBLEM — R91.8 LUNG NODULES: Status: ACTIVE | Noted: 2017-08-22

## 2020-11-04 PROBLEM — K29.80 DUODENITIS: Status: ACTIVE | Noted: 2020-01-01

## 2020-11-04 NOTE — PROGRESS NOTES
You have chosen to receive care through a telephone visit. Do you consent to use a telephone visit for your medical care today? Yes      Interval history since last visit: Recent worsening of shortness of breath, treated for COPD exacerbation at the ER    Recent hospitalizations: Recent ED visit on 10/28/2020 to [place] [facility]    Investigations (imaging, PFT's, labs, sleep study, record requests, etc.)        Subjective    Bhavana Hanson presents for the following Emphysema, pulmonary nodule, Shortness of Breath    History of Present Illness     Patient presents today via telephone for follow-up of centrilobular emphysema, pulmonary nodules with past medical history of lung cancer unknown type, chronic hypoxic respiratory failure, cigarette nicotine dependence.  She continues to smoke approximately 1 pack/day and is not interested in complete cessation at this time.  She states that she did recently have to visit the ER in [place] due to increased shortness of breath and lower extremity edema.  We received the labs from this ER evaluation which were reviewed and suggested possible CHF as proBNP was elevated and CBC showed no leukocytosis.  ABG showed appropriate PO2 on 2 L.  COVID-19 testing was negative.  She was able to return home and continues on supplemental oxygen continuously but states she has increased this to 3.5 L.  She admits to some shortness of breath still but improved since ER visit.  She is experiencing some sinus congestion.  Leg swelling is also improving.  No hemoptysis, fever, chills.  She was previously recommended for follow-up at [facility] due to increasing size of the previously monitor pulmonary nodules with underlying lung cancer history post radiation therapy at [facility] in [place], but patient declined the follow-up at that time.      Review of Systems   Constitutional: Negative for chills and fever.   HENT: Positive for congestion. Negative for rhinorrhea.    Respiratory:  Positive for cough and shortness of breath. Negative for wheezing.    Cardiovascular: Positive for leg swelling (improving). Negative for chest pain.   Neurological: Negative for dizziness and light-headedness.   Psychiatric/Behavioral: Negative for agitation and confusion.            Active Problems:  Problem List Items Addressed This Visit        Respiratory    Malignant neoplasm of upper lobe of right lung (CMS/HCC)    Relevant Medications    Ventolin  (90 Base) MCG/ACT inhaler    budesonide-formoterol (Symbicort) 160-4.5 MCG/ACT inhaler    tiotropium (SPIRIVA) 18 MCG per inhalation capsule    Multiple pulmonary nodules    Overview     Overview:   Approximately 13 mm right upper lobe 8/7/17         Chronic respiratory failure with hypoxia (CMS/HCC)    Centrilobular emphysema (CMS/HCC) - Primary    Relevant Medications    Ventolin  (90 Base) MCG/ACT inhaler    budesonide-formoterol (Symbicort) 160-4.5 MCG/ACT inhaler    tiotropium (SPIRIVA) 18 MCG per inhalation capsule       Other    Current tobacco use          Past Medical History:  Past Medical History:   Diagnosis Date   • Arthritis    • COPD (chronic obstructive pulmonary disease) (CMS/HCC)    • Diabetes mellitus (CMS/HCC)    • Hypertension    • Lung cancer (CMS/[facility])    • Smoking addiction        Family History:  Family History   Problem Relation Age of Onset   • Heart failure Father    • Cancer Maternal Aunt        Social History:  Social History     Tobacco Use   • Smoking status: Current Every Day Smoker     Packs/day: 0.25     Years: 40.00     Pack years: 10.00     Types: Cigarettes   • Smokeless tobacco: Never Used   Substance Use Topics   • Alcohol use: No     Frequency: Never       Current Medications:  Current Outpatient Medications   Medication Sig Dispense Refill   • albuterol (PROVENTIL) (2.5 MG/3ML) 0.083% nebulizer solution Take 2.5 mg by nebulization 4 (Four) Times a Day. 120 mL 8   • aspirin 81 MG EC tablet Take 81 mg by mouth.      • atorvastatin (LIPITOR) 20 MG tablet Take 20 mg by mouth Daily.     • budesonide-formoterol (Symbicort) 160-4.5 MCG/ACT inhaler Inhale 2 puffs 2 (Two) Times a Day for 90 days. 3 inhaler 8   • Calcium 600-D 600-400 MG-UNIT tablet      • clopidogrel (PLAVIX) 75 MG tablet Take 75 mg by mouth.     • fenofibrate (TRICOR) 48 MG tablet Take 48 mg by mouth.     • ferrous sulfate 324 (65 Fe) MG tablet delayed-release EC tablet Take 324 mg by mouth Daily With Breakfast.     • hydroxychloroquine (PLAQUENIL) 200 MG tablet TAKE 1 TABLET BY MOUTH 2 TIMES A DAY WITH FOOD AS DIRECTED BY YOUR PROVIDER    (KEEP OUT OF REACH OF CHILDREN)  1   • ipratropium-albuterol (DUO-NEB) 0.5-2.5 mg/3 ml nebulizer Take 3 mL by nebulization 4 (Four) Times a Day As Needed for Wheezing. 120 mL 11   • losartan (COZAAR) 25 MG tablet Take 25 mg by mouth.     • nitroglycerin (NITRO-BID) 6.5 MG CR capsule TAKE 1 CAPSULE TWICE A DAY     • Omega-3 Fatty Acids (FISH OIL) 1000 MG capsule capsule Take 1,000 mg by mouth.     • pantoprazole (PROTONIX) 40 MG EC tablet Take 40 mg by mouth.     • spironolactone (ALDACTONE) 50 MG tablet Take 25 mg by mouth.     • therapeutic multivitamin-minerals (THERAGRAN-M) tablet Take 1 tablet by mouth.     • Ventolin  (90 Base) MCG/ACT inhaler Inhale 2 puffs Every 4 (Four) Hours As Needed for Wheezing. 54 g 8   • doxycycline (VIBRAMYCIN) 100 MG capsule Take 1 capsule by mouth 2 (Two) Times a Day for 5 days. 10 capsule 0   • tiotropium (SPIRIVA) 18 MCG per inhalation capsule Place 1 capsule into inhaler and inhale Daily. 90 capsule 12     No current facility-administered medications for this visit.        Allergies:  Allergies   Allergen Reactions   • Atorvastatin Hives   • Rosuvastatin Hives   • Crestor [Rosuvastatin Calcium] Hives   • Lipitor [Atorvastatin Calcium] Hives   • Metformin Nausea And Vomiting       Vitals:  There were no vitals taken for this visit.    Imaging:    Imaging Results (Most Recent)     None           Pulmonary Functions Testing Results:    No results found for: FEV1, FVC, [id number], TLC, DLCO    No results found for this or any previous visit.    Objective   Physical Exam     Physical exam not performed this encounter was completed via telephone.        Assessment/Plan      I have reviewed the past medical history, family history, social history, surgical history, and allergies.     I have reviewed the recent lab assessment and chest x-ray report from [place] ER visit on 10/28/2020.  This was notable for elevated proBNP of 4562.  CBC showed no leukocytosis. Anemia was noted with hemoglobin of 7.7 and macrocytosis with elevated MCV.  COVID-19 testing was negative.  Creatinine was elevated at 1.54 with BUN of 23.  EGFR noted at 33.  ABG showed 7.44, 45.7, 86.5, 31.2 on 2 L nasal cannula.    Reviewed the chest x-ray report from 10/28/2020.  Suggested chronic changes with chronic left pleural blunting.  No appearance of edema or focal infiltrate suggested.    EKG on 10/28 was notable for atrial fibrillation with QTC prolongation of 502 ms.    I have reviewed and compared the previous CT imaging and reports from June 2020, and compared with the most recent CT imaging in September 2020.  Right upper lobe nodule measures around 1.1 cm, previously reported as 9.7 mm. Left upper lobe nodule has increased in size to 1.12 cm, previously measuring 8.5 mm.   Suggested two 4 mm nodules of the left lung on image 45.   Also mentioned a pleural-based posteriomedially nodule the right lung on image 69 measuring 1.1 cm, which was reportedly stable since the previous scan in June 2020 and had also measured 1.1 cm per previous imaging (likely a typo on the newest report as they stated that this had previously measured 1.1 mm).     Previous CT imaging and report from June 16, 2020 was reviewed.    Previous report From CT scan from [facility] was reviewed, completed in 2018.          ICD-10-CM ICD-9-CM   1.  Centrilobular emphysema (CMS/[facility])  J43.2 492.8   2. Chronic respiratory failure with hypoxia (CMS/[facility])  J96.11 518.83     799.02   3. Multiple pulmonary nodules  R91.8 793.19   4. Malignant neoplasm of upper lobe of right lung (CMS/[facility])  C34.11 162.3   5. Current tobacco use  Z72.0 305.1          Centrilobular emphysema:   · Currently taking mucinex as needed.   · Continuing on Symbicort 160 as directed  · Restarting on Spiriva HandiHaler.  Patient reported that she had samples available at home.  · Continuing albuterol inhaler as needed.  · Continuing with nebulizer treatments as needed  · Recommended echocardiogram.  Patient prefers to await assessment at this time.    Inhaler refills sent to [facility].    · Ordered 5-day dose of doxycycline as she reports sinus congestion as well as persistent shortness of breath with slight improvement since the ER visit  · Continues on oral steroids currently.        Chronic hypoxic respiratory failure:   · On 3.5 L/m continuously.  · Discussed that she can titrate down supplemental oxygen as tolerated, with a goal saturation of 90% or greater.          Multiple pulmonary nodules with history of lung cancer post radiation:  Upon previous CT imaging and report review, it appears that the right upper lobe  Nodule has remained stable around 1.1 cm.  The left upper lobe nodule has reportedly increased from 8.5 mm to 1.2 cm.  The right upper lobe nodule also appeared to increase from 9.7 mm to 1.1 cm.  Report also mentioned two 4 mm nodules of the left lung on image 45.   Patient has history of right upper lobe cancer, and completed 5 radiation treatments.    She has been told not to undergo biopsy in the past due to severity of emphysema.  · Upon review the last CT imaging in September 2020 with increase of the left upper lobe nodule, Dr. Pacheco recommended referral to [facility].  Patient preferred to await the referral at that time.  · Upon discussion today,  she is more agreeable to a possible PET scan or oncology referral.  She would prefer this to be locally in [place] if able.   · Ordered PET scan.    She prefers if repeat imaging completed to await repeat imaging until the next 2 to 3 months.  · We will obtain to obtain records from [facility] to see the exact cancer diagnosis.  Addendum: Reviewed the last note from [facility] oncology from 7/26/2019 before she moved to [place], this did not state the specific type of cancer as it stated that the biopsy was difficult, and was recommended for SBRT.         Current smoker:   Currently using 2 cigarettes per day.    · Not interested in complete cessation at this time.        Vaccinations: Not yet received the updated influenza vaccination.  Pneumonia vaccinations are up-to-date.    Return in about 2 months (around 1/5/2021), or as needed.           This visit has been rescheduled as a phone visit to comply with patient safety concerns in accordance with CDC recommendations. Total time of discussion was 35 minutes.

## 2020-11-12 NOTE — TELEPHONE ENCOUNTER
Called patient and confirmed that she preferred to proceed with PET scan, preferably in January, rather than biopsy she was told in the past that she was not recommended for biopsy due to significant COPD and high risk of lung collapse.    She stated that she was still feeling somewhat short of breath after the recently prescribed antibiotics and steroids. She plans to discuss with her PCP as she also has some lower extremity edema still. She was not given diuretics with her recent ED due to elevated renal function on labs. I agreed with her that after lab review, fluid retention could be the major contributing factor to her breathing at this time.     PET scan order currently ordered.

## 2021-01-01 ENCOUNTER — HOSPITAL ENCOUNTER (EMERGENCY)
Facility: HOSPITAL | Age: 71
End: 2021-07-14
Attending: EMERGENCY MEDICINE | Admitting: EMERGENCY MEDICINE

## 2021-01-01 ENCOUNTER — TRANSCRIBE ORDERS (OUTPATIENT)
Dept: ADMINISTRATIVE | Facility: HOSPITAL | Age: 71
End: 2021-01-01

## 2021-01-01 ENCOUNTER — APPOINTMENT (OUTPATIENT)
Dept: RADIATION ONCOLOGY | Facility: HOSPITAL | Age: 71
End: 2021-01-01

## 2021-01-01 ENCOUNTER — APPOINTMENT (OUTPATIENT)
Dept: PET IMAGING | Facility: HOSPITAL | Age: 71
End: 2021-01-01

## 2021-01-01 ENCOUNTER — CONSULT (OUTPATIENT)
Dept: RADIATION ONCOLOGY | Facility: HOSPITAL | Age: 71
End: 2021-01-01

## 2021-01-01 VITALS — OXYGEN SATURATION: 86 % | TEMPERATURE: 96 F

## 2021-01-01 DIAGNOSIS — C34.11 MALIGNANT NEOPLASM OF UPPER LOBE OF RIGHT LUNG (HCC): Primary | ICD-10-CM

## 2021-01-01 DIAGNOSIS — I46.9 CARDIAC ARREST (HCC): Primary | ICD-10-CM

## 2021-01-01 DIAGNOSIS — Z01.818 PRE-OPERATIVE CLEARANCE: Primary | ICD-10-CM

## 2021-01-01 PROCEDURE — 99284 EMERGENCY DEPT VISIT MOD MDM: CPT

## 2021-01-01 PROCEDURE — 94799 UNLISTED PULMONARY SVC/PX: CPT

## 2021-01-01 PROCEDURE — 99203 OFFICE O/P NEW LOW 30 MIN: CPT | Performed by: RADIOLOGY

## 2021-01-01 PROCEDURE — 92950 HEART/LUNG RESUSCITATION CPR: CPT

## 2021-01-01 PROCEDURE — 31500 INSERT EMERGENCY AIRWAY: CPT

## 2021-01-01 RX ORDER — BUDESONIDE AND FORMOTEROL FUMARATE DIHYDRATE 160; 4.5 UG/1; UG/1
2 AEROSOL RESPIRATORY (INHALATION)
Qty: 1 EACH | Refills: 3 | Status: SHIPPED | OUTPATIENT
Start: 2021-01-01 | End: 2021-01-01

## 2021-07-01 NOTE — PROGRESS NOTES
Chief Complaint  No chief complaint on file.    Subjective          Bhavana Hanson presents to [facility] RADIATION ONCOLOGY  History of Present Illness  10-year-old female with multiple comorbidities including several multivessel coronary artery disease congestive heart failure COPD.  Underwent cardiac catheterization with percutaneous stent placement 2021 she had a history of stage I lung cancer diagnosed  and EBUS and julius sampling was negative she was not felt to be a surgical candidate.  He was treated with SBRT in 2019 in [place].      She was last as a patient follow-up because her moved to [place] and is not into 2021 she is starting to have complaints of worsening shortness of breath.  And she her new scan revealed new nodules in bilateral lungs and she had underwent CT-guided FNA biopsy of the right lung March 3, 2021.  It was consistent with squamous cell carcinoma.    She does report chronic cough and shortness of breath she is on oxygen per nasal cannula 3 L.  Objective      Current medications: spironolactone Plavix fenofibrate aspirin losartan atorvastatin Coreg fish oil calcium Symbicort hydroxychloroquine, iron p.o. Effexor Flexeril Lasix Incruse inhaler albuterol solution inhaler nitroglycerin     Medical history: heart disease congestive heart failure hypertension heart attack hyperlipidemia COPD asthma chronic kidney disease of tear arthritis rheumatoid arthritis stroke diabetes anemia depression anxiety    Past surgical history hysterectomy 19 he went hernia repair heart stent 2005 heart stents x2 in     Family history family history hypertension diabetes Father  age 46 mother passed at age 81.  Cough social history stopped smoking few months ago previously she smoked half a pack a day for 50+ years  vital Signs:   There were no vitals taken for this visit.    Physical Exam   Result Review :          Physical exam deferred    MRI of the brain on  520-second 2021 showed unremarkable there is no metastatic disease in the brain.    CT Chest without contrast on 9/20/2021    FINDINGS:     Lungs: Right apical parenchymal nodule with somewhat irregular margins  measuring 1.1 cm and previously measured 9.7 mm. This is on image 16 of  the axial series.  Parenchymal nodule in the left upper lobe on image 40 of the axial  series measuring 1.12 cm and previously measured 8.5 mm.  There are two 4 mm nodules in the left lung on image 45 of the axial  series.  Pleural-based nodule posteromedially in the right lung on image 69 of  the axial series. This measures 1.1 cm. On the prior study it was  measured at 1.1 mm.  No new nodules are present.  Heart: Extensive coronary artery calcifications are present.  Pericardium: No effusion.  Mediastinum: No masses. No enlarged lymph nodes.  No fluid collections.  Pleura: No pleural effusion. No pleural mass or abnormal calcification.  No pneumothorax.  Major airways: Clear. No intrinsic mass.  Vasculature: Evidence of atherosclerotic vascular disease.  Visualized upper abdomen:The upper abdomen is unremarkable as  visualized.  Other: None.  Bones: No acute bony abnormality.     IMPRESSION:  1. Parenchymal nodules bilaterally. They have increased in size since  the prior study. I would recommend PET/CT to further evaluate.  2. Extensive coronary artery calcifications.        The PET CT on June 23, 2021 at [facility] showed a right upper lobe pulmonary nodule has increased in size measuring 2.5 cm and was 2.0 cm maximum SUVs of 8.4.  A pleural-based right lower lobe nodule medially has a maximum SUV of 3.6 and measures 2.3 cm it previously measured 1.7 cm.  There is a pleural-based left lower lobe nodule measuring 8 mm non-FDG avid but likely too small for sensitivity with PET.  It previously measured 5 mm there is a stable 9 mm left upper lobe pulmonary nodule.    Impression hypermetabolic right upper and lower lobe  pulmonary nodules consistent malignancy both of increasing size since February.     Assessment and Plan    There are no diagnoses linked to this encounter.     This is a 70-year-old female with stage I pulmonary non-small cell carcinoma who was treated in [place] for SBRT for a right-sided pulmonary nodule in 2019.  She presented with a stage 4, M1a bilateral disease due to questionable left-sided lower lobe nodule.  There are 2 separate nodules in the right upper and lower lobe that increased in size and  metabolic activities.    It might be worthwhile to consider systemic immunotherapy, combined with SBRT to the 2 lung lesions.  It  is noted that we do not have SBRT capacity here in [place] we are [facility] of [facility] SBRT treatment to the right-sided lung lesions.    Patient will follow up with us if needed    Follow Up   No follow-ups on file.  Patient was given instructions and counseling regarding her condition or for health maintenance advice. Please see specific information pulled into the AVS if appropriate.

## 2021-07-14 NOTE — ED PROVIDER NOTES
Subjective   Patient presents to ER in full cardiac arrest. [place] [address] when she became short of breath, then became unresponsive. CPR was initiated by family , then continued by EMS.      Cardiac Arrest  Witnessed by:  Family member  Incident location: [place] [address], at [facility].  Time before BLS initiated:  > 5 minutes  Time before ALS initiated:  > 10 minutes  Condition upon EMS arrival:  Unresponsive  Pulse:  Absent  Initial cardiac rhythm per EMS:  Asystole  Treatments prior to arrival:  ACLS protocol      Review of Systems   Unable to perform ROS: Patient unresponsive       No past medical history on file.    Not on File    No past surgical history on file.    No family history on file.             Objective   Physical Exam  Vitals and nursing note reviewed.   Constitutional:       Comments: Unresponsive, pulseless   Eyes:      Comments: Pupils fixed, unreactive   Cardiovascular:      Comments: PEA  Pulmonary:      Comments: No respiratory effort  Abdominal:      General: There is distension.   Neurological:      Comments: No neurologic function         Intubation    Date/Time: 7/14/2021 7:01 PM  Performed by: Dipak Hobbs MD  Authorized by: Dipak Hobbs MD     Consent:     Consent obtained:  Emergent situation  Pre-procedure details:     Patient status:  Unresponsive    Mallampati score:  II    Pretreatment medications:  None    Paralytics:  None  Procedure details:     Preoxygenation:  Bag valve mask    CPR in progress: yes      Intubation method:  Oral    Laryngoscope blade:  Cisneros 3    Tube size (mm):  8.0    Tube type:  Cuffed    Number of attempts:  1               ED Course  ED Course as of Jul 14 1904 Wed Jul 14, 2021 1903 Patient was pulseless, no neuro function, code stopped at 18:50    [DEEPALI]      ED Course User Index  [DEEPALI] iDpak Hobbs MD                                           [facility]    Final diagnoses:   Cardiac arrest (CMS/HCC)       ED Disposition  ED Disposition     ED  Disposition Condition Comment                No follow-up provider specified.       Medication List      No changes were made to your prescriptions during this visit.          Dipak Hobbs MD  21 [id number]

## 2021-07-15 NOTE — ED NOTES
Called [facility] and spoke with Luda Artis, information provided, patient released and not a donor per family request. Case ID number [id number]     Janet Galindo, RN  07/14/21 1935    
Called [facility]  home per patients family request.     Brenton Joya, RN  21    
Face sheet and provisional report of death faxed to [facility].      Janet Galindo, RN  07/14/21 3750    
[facility] here at this time to transport patient to the  home. Face sheet and top copy of provisional provided to  home representative.     Janet Galindo, RN  21 [phone]    
Patient's spouse signed provisional report of death, requests [facility] in [place].      Janet Galindo, RN  21    
No

## 2022-07-14 NOTE — TELEPHONE ENCOUNTER
Called the patient at her mobile number listed but she was unavailable. The mailbox was full, thus I was unable to leave a message.     I called the home phone number listed for her . I was able to leave a message requesting that Mrs. Hanson call our office to discuss her CT scan results.     Dr. Pacheco reviewed the imaging and recommended repeat CT without contrast be completed in 3 months.   CT was notable for:   · Emphysematous blebs  · Right upper nodule measuring 9.7 mm (image 16)  · Left upper nodule measuring 8.5 mm (image 36)  · Right lower nodule measuring 11 mm (image 65)  · Incidentally notable for heavy atherosclerotic calcifications  · Small left lower 4 mm nodule was not mentioned in the report, noted on image 42      Previous CT in January 2020 was notable for the following:         Multiple pulmonary nodules measuring > 6 mm:   · Ordered repeat CT chest without contrast for September 2020 for 3 month follow up  
No